# Patient Record
Sex: MALE | Race: WHITE | NOT HISPANIC OR LATINO | Employment: FULL TIME | URBAN - METROPOLITAN AREA
[De-identification: names, ages, dates, MRNs, and addresses within clinical notes are randomized per-mention and may not be internally consistent; named-entity substitution may affect disease eponyms.]

---

## 2017-09-11 VITALS
RESPIRATION RATE: 20 BRPM | WEIGHT: 210 LBS | DIASTOLIC BLOOD PRESSURE: 91 MMHG | OXYGEN SATURATION: 98 % | HEART RATE: 79 BPM | HEIGHT: 65 IN | TEMPERATURE: 99 F | SYSTOLIC BLOOD PRESSURE: 163 MMHG | BODY MASS INDEX: 34.99 KG/M2

## 2017-09-11 PROCEDURE — 99283 EMERGENCY DEPT VISIT LOW MDM: CPT | Mod: 25

## 2017-09-11 PROCEDURE — 64402 HC NERVE BLOCK INJ - FACIAL: CPT

## 2017-09-11 RX ORDER — ETODOLAC 300 MG/1
300 CAPSULE ORAL 2 TIMES DAILY
COMMUNITY
End: 2018-01-31

## 2017-09-12 ENCOUNTER — HOSPITAL ENCOUNTER (EMERGENCY)
Facility: HOSPITAL | Age: 32
Discharge: HOME | End: 2017-09-12
Attending: EMERGENCY MEDICINE
Payer: OTHER GOVERNMENT

## 2017-09-12 DIAGNOSIS — K04.7 DENTAL ABSCESS: Primary | ICD-10-CM

## 2017-09-12 PROCEDURE — 25000003 PHARM REV CODE 250: Performed by: EMERGENCY MEDICINE

## 2017-09-12 RX ORDER — AMOXICILLIN 500 MG/1
500 CAPSULE ORAL 3 TIMES DAILY
Qty: 21 CAPSULE | Refills: 0 | Status: SHIPPED | OUTPATIENT
Start: 2017-09-12 | End: 2017-09-19

## 2017-09-12 RX ORDER — AMOXICILLIN 250 MG/1
500 CAPSULE ORAL
Status: COMPLETED | OUTPATIENT
Start: 2017-09-12 | End: 2017-09-12

## 2017-09-12 RX ORDER — BUPIVACAINE HYDROCHLORIDE AND EPINEPHRINE 5; 5 MG/ML; UG/ML
10 INJECTION, SOLUTION EPIDURAL; INTRACAUDAL; PERINEURAL ONCE
Status: COMPLETED | OUTPATIENT
Start: 2017-09-12 | End: 2017-09-12

## 2017-09-12 RX ORDER — HYDROCODONE BITARTRATE AND ACETAMINOPHEN 5; 325 MG/1; MG/1
1 TABLET ORAL EVERY 4 HOURS PRN
Qty: 15 TABLET | Refills: 0 | Status: SHIPPED | OUTPATIENT
Start: 2017-09-12 | End: 2017-10-22

## 2017-09-12 RX ADMIN — AMOXICILLIN 500 MG: 250 CAPSULE ORAL at 12:09

## 2017-09-12 RX ADMIN — BUPIVACAINE HYDROCHLORIDE AND EPINEPHRINE BITARTRATE 10 ML: 5; .005 INJECTION, SOLUTION EPIDURAL; INTRACAUDAL; PERINEURAL at 12:09

## 2017-09-12 NOTE — ED PROVIDER NOTES
Encounter Date: 9/11/2017    SCRIBE #1 NOTE: I, Zachary Cifuentes, am scribing for, and in the presence of,  Ha Ruby MD. I have scribed the entire note.       History     Chief Complaint   Patient presents with    Dental Pain     presents with dental pain to bottom L back tooth. tooth broken and decayed x5 months. reports increased pain x2-3 days. no obvious facial swelling. denies fever, chill.s appt with dentist on Wednesday.     Time patient was seen by the provider: 12:30 AM      The patient is a 31 y.o. male with hx of: brain surgery that presents to the ED with a complaint of left lower dental pain for the past 3 days. He notes it has had increased swelling in the past 24 hours or so. He has not had any fever, N/V, shortness of breath or difficulty swallowing.     He is on Lodine.            Review of patient's allergies indicates:  No Known Allergies  History reviewed. No pertinent past medical history.  Past Surgical History:   Procedure Laterality Date    BRAIN SURGERY      TRACHEAL SURGERY  had scar tissue removed around trachea     History reviewed. No pertinent family history.  Social History   Substance Use Topics    Smoking status: Current Every Day Smoker     Packs/day: 0.50     Types: Cigarettes    Smokeless tobacco: Current User    Alcohol use Yes      Comment: 8-9 cigarettes a day     Review of Systems   Constitutional: Negative for fever.   HENT: Positive for dental problem. Negative for sore throat.    Respiratory: Negative for shortness of breath.    Cardiovascular: Negative for chest pain.   Gastrointestinal: Negative for nausea.   Genitourinary: Negative for dysuria.   Musculoskeletal: Negative for back pain.   Skin: Negative for rash.   Neurological: Negative for weakness.   Hematological: Does not bruise/bleed easily.       Physical Exam     Initial Vitals [09/11/17 2259]   BP Pulse Resp Temp SpO2   (!) 163/91 79 20 98.5 °F (36.9 °C) 98 %      MAP       115         Physical  Exam    Nursing note and vitals reviewed.  Constitutional: He appears well-developed and well-nourished.   HENT:   Head: Normocephalic and atraumatic.   Filling present, tooth # 7  cracked and missing tooth # 15 cracked, carries at site of fillings, tooth 19 is tender with minimal gingival erythema,    Eyes: Conjunctivae are normal.   Neck: Neck supple.   Cardiovascular: Normal rate, regular rhythm, normal heart sounds and intact distal pulses. Exam reveals no gallop and no friction rub.    No murmur heard.  Pulmonary/Chest: Breath sounds normal. He has no wheezes. He has no rhonchi. He has no rales.   Abdominal: Soft. He exhibits no distension. There is no tenderness.   Musculoskeletal: Normal range of motion.   Neurological: He is alert and oriented to person, place, and time.   Skin: No rash noted. No erythema.   Psychiatric: He has a normal mood and affect.         ED Course   Nerve Block  Date/Time: 9/12/2017 12:43 AM  Performed by: ALBER WISEMAN.  Authorized by: ALBER WISMEAN   Consent Done: Not Needed  Indications: pain relief  Body area: face/mouth  Nerve: inferior alveolar  Laterality: left  Patient sedated: no  Patient position: sitting  Needle size: 25 G  Location technique: anatomical landmarks  Local Anesthetic: bupivacaine 0.5% with epinephrine  Complications: No  Outcome: pain improved  Patient tolerance: Patient tolerated the procedure well with no immediate complications        Labs Reviewed - No data to display          Medical Decision Making:   ED Management:  The patient appears to have pulpitis.  Based upon the history and physical I see no signs of Mode's angina, sublingual swelling, facial swelling, airway compromise, facial cellulitis, sepsis, dehydration, or a fluctuant abscess to drain.  I believe the patient can be discharged home with antibiotics, anti-inflammatories, and pain medications.  The patient has been given specific return precautions and instructed to follow up with a  dentist.     I will provide dental block and prescription for Amoxil.            Scribe Attestation:   Scribe #1: I performed the above scribed service and the documentation accurately describes the services I performed. I attest to the accuracy of the note.    Attending Attestation:           Physician Attestation for Scribe:  Physician Attestation Statement for Scribe #1: I, Ha Ruby MD, reviewed documentation, as scribed by Ha Cifuentes in my presence, and it is both accurate and complete.                 ED Course      Clinical Impression:     1. Dental abscess                                 Ha Ruby MD  09/12/17 0057

## 2017-10-22 ENCOUNTER — HOSPITAL ENCOUNTER (EMERGENCY)
Facility: HOSPITAL | Age: 32
Discharge: HOME | End: 2017-10-22
Attending: EMERGENCY MEDICINE
Payer: OTHER GOVERNMENT

## 2017-10-22 VITALS
TEMPERATURE: 99 F | OXYGEN SATURATION: 97 % | WEIGHT: 210 LBS | HEIGHT: 66 IN | HEART RATE: 77 BPM | RESPIRATION RATE: 16 BRPM | SYSTOLIC BLOOD PRESSURE: 174 MMHG | BODY MASS INDEX: 33.75 KG/M2 | DIASTOLIC BLOOD PRESSURE: 87 MMHG

## 2017-10-22 DIAGNOSIS — K02.9 DENTAL CARIES: ICD-10-CM

## 2017-10-22 DIAGNOSIS — K04.01 ACUTE PULPITIS: Primary | ICD-10-CM

## 2017-10-22 PROCEDURE — 99283 EMERGENCY DEPT VISIT LOW MDM: CPT

## 2017-10-22 RX ORDER — PENICILLIN V POTASSIUM 500 MG/1
500 TABLET, FILM COATED ORAL EVERY 6 HOURS
Qty: 28 TABLET | Refills: 0 | Status: SHIPPED | OUTPATIENT
Start: 2017-10-22 | End: 2017-10-29

## 2017-10-22 NOTE — ED PROVIDER NOTES
Encounter Date: 10/22/2017       History     Chief Complaint   Patient presents with    Dental Pain     pt c/o lower right dental pain that started yesterday      32-year-old male here for right lower dental pain for 3 days.  He reports he has had a cracked tooth for over one month, but the pain started 3 days ago.  He denies fevers/chills, trauma, sore throat, dyspnea, drooling, dysphasia, and facial swelling.  He is on Lodine for chronic back pain, and he reports the medication is also helping his dental pain.  He is established with [facility] dental clinic, and his next appointment is on November 2.  He denies history of immunosuppression and diabetes.that is the extent of the patient's complaints at this time.      The history is provided by the patient.   Dental Pain   The primary symptoms include mouth pain. Primary symptoms do not include dental injury, oral bleeding, oral lesions, headaches, fever, sore throat, angioedema or cough. The symptoms began several days ago. The symptoms are unchanged. The symptoms are new. The symptoms occur constantly.   Mouth pain began 3 - 5 days ago. Mouth pain occurs constantly. Mouth pain is unchanged. Affected locations include: teeth. At its highest the mouth pain was at 7/10. The mouth pain is currently at 7/10.   Additional symptoms include: dental sensitivity to temperature. Additional symptoms do not include: gum swelling, gum tenderness, purulent gums, trismus, jaw pain, facial swelling, trouble swallowing, pain with swallowing, excessive salivation, dry mouth, taste disturbance, smell disturbance, drooling, ear pain, nosebleeds and fatigue. Medical issues include: smoking. Medical issues do not include: immunosuppression.     Review of patient's allergies indicates:  No Known Allergies  History reviewed. No pertinent past medical history.  Past Surgical History:   Procedure Laterality Date    BRAIN SURGERY      TRACHEAL SURGERY  had scar tissue removed around trachea      History reviewed. No pertinent family history.  Social History   Substance Use Topics    Smoking status: Current Every Day Smoker     Packs/day: 0.50     Types: Cigarettes    Smokeless tobacco: Former User    Alcohol use Yes      Comment: 8-9 cigarettes a day     Review of Systems   Constitutional: Negative for chills, fatigue and fever.   HENT: Positive for dental problem. Negative for drooling, ear pain, facial swelling, nosebleeds, rhinorrhea, sore throat and trouble swallowing.    Respiratory: Negative for cough.    Cardiovascular: Negative for chest pain.   Gastrointestinal: Negative for vomiting.   Musculoskeletal: Negative for neck pain and neck stiffness.   Skin: Negative.  Negative for wound.   Allergic/Immunologic: Negative for immunocompromised state.   Neurological: Negative for headaches.   Psychiatric/Behavioral: Negative for confusion.   All other systems reviewed and are negative.      Physical Exam     Initial Vitals [10/22/17 1758]   BP Pulse Resp Temp SpO2   (!) 174/87 77 16 98.5 °F (36.9 °C) 97 %      MAP       116         Physical Exam    Nursing note and vitals reviewed.  Constitutional: Vital signs are normal. He appears well-developed and well-nourished. He is Obese . He is active and cooperative. He is easily aroused.  Non-toxic appearance. He does not have a sickly appearance. He does not appear ill. No distress.   HENT:   Head: Normocephalic and atraumatic.   Right Ear: External ear normal.   Left Ear: External ear normal.   Nose: Nose normal.   Mouth/Throat: Uvula is midline, oropharynx is clear and moist and mucous membranes are normal. He does not have dentures. No oral lesions. No trismus in the jaw. Normal dentition. Dental caries present. No dental abscesses or uvula swelling.       Dental fracture.  No visualized abscess.  No Mode's or trismus.    Eyes: Conjunctivae are normal.   Neck: Normal range of motion and full passive range of motion without pain.   Cardiovascular:  Normal rate and regular rhythm.   Pulmonary/Chest: Effort normal and breath sounds normal.   Abdominal: Soft. Normal appearance and bowel sounds are normal. There is no tenderness.   Lymphadenopathy:     He has no cervical adenopathy.   Neurological: He is alert, oriented to person, place, and time and easily aroused. GCS eye subscore is 4. GCS verbal subscore is 5. GCS motor subscore is 6.   Skin: Skin is warm, dry and intact. No rash noted.   Psychiatric: He has a normal mood and affect. His speech is normal and behavior is normal. Judgment and thought content normal. Cognition and memory are normal.         ED Course   Procedures  Labs Reviewed - No data to display          Medical Decision Making:   Initial Assessment:   33yo male here for right lower dental pain for three days.  No trauma, fever/chills, dyspnea, drooling, facial swelling, dysphagia, sore throat, or vomiting.  The patient reports that his Lodine does help the pain.   Pt appears well, nontoxic. Percussion tenderness and dental decay at #28.  No visualized abscess.  No Mode's or trismus.  Throat normal.    Differential Diagnosis:   Acute pulpitis, dental caries, dental abscess  ED Management:  Exam  There is no indication for imaging or labs at this time.  I do not suspect a deep space infection.  Pt has no Mode's angina.  There is no indication for emergent extraction.  I feel the patient's pain is due to acute pulpitis due to dental decay.  He is to follow-up with his dentist as scheduled.  Return to the ER if condition changes, progresses, or if there are any concerns.  I did advised the patient to stop smoking.the patient verbalizes understanding, compliance, and agreement with the treatment plan.  RX Penicillin V Potassium                    ED Course      Clinical Impression:   The primary encounter diagnosis was Acute pulpitis. A diagnosis of Dental caries was also pertinent to this visit.                           Tasha PADILLA  Fawad, JULIA  10/22/17 1832

## 2017-10-22 NOTE — ED NOTES
Pain to right lower tooth that began last pm.  Sees [facility] but cannot be seen until 11/2/2017.  Here for pain medication.  Denies fever, drainage from gums

## 2018-01-31 ENCOUNTER — OFFICE VISIT (OUTPATIENT)
Dept: FAMILY MEDICINE | Facility: HOSPITAL | Age: 33
End: 2018-01-31
Attending: FAMILY MEDICINE
Payer: OTHER GOVERNMENT

## 2018-01-31 VITALS
DIASTOLIC BLOOD PRESSURE: 85 MMHG | HEART RATE: 72 BPM | BODY MASS INDEX: 35.86 KG/M2 | HEIGHT: 66 IN | SYSTOLIC BLOOD PRESSURE: 129 MMHG | WEIGHT: 223.13 LBS

## 2018-01-31 DIAGNOSIS — M54.50 CHRONIC BILATERAL LOW BACK PAIN WITHOUT SCIATICA: Primary | ICD-10-CM

## 2018-01-31 DIAGNOSIS — R29.818 NEUROLOGICAL ABNORMALITY: ICD-10-CM

## 2018-01-31 DIAGNOSIS — G89.29 CHRONIC BILATERAL LOW BACK PAIN WITHOUT SCIATICA: Primary | ICD-10-CM

## 2018-01-31 DIAGNOSIS — R29.2 HYPERREFLEXIA: ICD-10-CM

## 2018-01-31 DIAGNOSIS — R47.9 SPEECH DISTURBANCE, UNSPECIFIED TYPE: ICD-10-CM

## 2018-01-31 DIAGNOSIS — R29.2 HYPERREFLEXIA: Primary | ICD-10-CM

## 2018-01-31 DIAGNOSIS — F48.9: ICD-10-CM

## 2018-01-31 PROCEDURE — 99213 OFFICE O/P EST LOW 20 MIN: CPT | Performed by: FAMILY MEDICINE

## 2018-01-31 RX ORDER — MELOXICAM 15 MG/1
15 TABLET ORAL DAILY
Qty: 30 TABLET | Refills: 0 | Status: SHIPPED | OUTPATIENT
Start: 2018-01-31 | End: 2018-02-01 | Stop reason: ALTCHOICE
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
## 2018-02-01 ENCOUNTER — OFFICE VISIT (OUTPATIENT)
Dept: NEUROLOGY | Facility: CLINIC | Age: 33
End: 2018-02-01
Payer: OTHER GOVERNMENT

## 2018-02-01 VITALS
HEART RATE: 73 BPM | BODY MASS INDEX: 36.03 KG/M2 | DIASTOLIC BLOOD PRESSURE: 89 MMHG | HEIGHT: 66 IN | SYSTOLIC BLOOD PRESSURE: 137 MMHG | WEIGHT: 224.19 LBS

## 2018-02-01 DIAGNOSIS — G40.409 EPILEPSY SYMPTOMATIC, GENERALIZED: ICD-10-CM

## 2018-02-01 PROCEDURE — 99999 PR PBB SHADOW E&M-EST. PATIENT-LVL III: CPT | Mod: PBBFAC,,, | Performed by: PSYCHIATRY & NEUROLOGY

## 2018-02-01 PROCEDURE — 99205 OFFICE O/P NEW HI 60 MIN: CPT | Mod: S$PBB,,, | Performed by: PSYCHIATRY & NEUROLOGY

## 2018-02-01 PROCEDURE — 3008F BODY MASS INDEX DOCD: CPT | Mod: ,,, | Performed by: PSYCHIATRY & NEUROLOGY

## 2018-02-01 PROCEDURE — 99213 OFFICE O/P EST LOW 20 MIN: CPT | Mod: PBBFAC | Performed by: PSYCHIATRY & NEUROLOGY
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
## 2018-02-01 NOTE — LETTER
February 1, 2018      Fran Acuña MD  [address]  [address]  [facility] [address]           [facility]  [address]  [place] [address]  Phone: [phone]  Fax: [phone]          Patient: Sudeep Kyle   MR Number: [id number]   YOB: 1985   Date of Visit: 2/1/2018       Dear Dr. Fran Acuña:    Thank you for referring Sudeep Kyle to me for evaluation. Attached you will find relevant portions of my assessment and plan of care.    If you have questions, please do not hesitate to call me. I look forward to following Sudeep Kyle along with you.    Sincerely,    Parmjit Freeman MD    Enclosure  CC:  No Recipients    If you would like to receive this communication electronically, please contact [email or web address] or [phone] to request more information on [employer] access.    For providers and/or their staff who would like to refer a patient to [facility], please contact us through our one-stop-shop provider referral line, [facility], at [phone].    If you feel you have received this communication in error or would no longer like to receive these types of communications, please e-mail [email or web address]

## 2018-02-14 ENCOUNTER — TELEPHONE (OUTPATIENT)
Dept: NEUROLOGY | Facility: CLINIC | Age: 33
End: 2018-02-14

## 2018-02-21 ENCOUNTER — HOSPITAL ENCOUNTER (OUTPATIENT)
Dept: RADIOLOGY | Facility: HOSPITAL | Age: 33
Discharge: HOME | End: 2018-02-21
Attending: PSYCHIATRY & NEUROLOGY
Payer: OTHER GOVERNMENT

## 2018-02-21 DIAGNOSIS — G40.409 EPILEPSY SYMPTOMATIC, GENERALIZED: ICD-10-CM

## 2018-02-21 PROCEDURE — 70551 MRI BRAIN STEM W/O DYE: CPT | Mod: 26,,, | Performed by: RADIOLOGY

## 2018-02-21 PROCEDURE — 70551 MRI BRAIN STEM W/O DYE: CPT | Mod: TC
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
## 2018-03-02 ENCOUNTER — TELEPHONE (OUTPATIENT)
Dept: NEUROLOGY | Facility: CLINIC | Age: 33
End: 2018-03-02

## 2018-03-05 ENCOUNTER — TELEPHONE (OUTPATIENT)
Dept: NEUROLOGY | Facility: CLINIC | Age: 33
End: 2018-03-05

## 2018-03-06 NOTE — TELEPHONE ENCOUNTER
I called him back to schedule his emu admit. Letter was sent with instructions. He will gypsy back for further concerns or questions

## 2018-03-13 ENCOUNTER — OFFICE VISIT (OUTPATIENT)
Dept: FAMILY MEDICINE | Facility: HOSPITAL | Age: 33
End: 2018-03-13
Attending: FAMILY MEDICINE
Payer: OTHER GOVERNMENT

## 2018-03-13 VITALS — HEART RATE: 69 BPM | DIASTOLIC BLOOD PRESSURE: 96 MMHG | SYSTOLIC BLOOD PRESSURE: 142 MMHG

## 2018-03-13 DIAGNOSIS — S83.92XA SPRAIN OF LEFT KNEE, UNSPECIFIED LIGAMENT, INITIAL ENCOUNTER: Primary | ICD-10-CM

## 2018-03-13 PROCEDURE — 99212 OFFICE O/P EST SF 10 MIN: CPT | Performed by: FAMILY MEDICINE

## 2018-03-13 RX ORDER — NAPROXEN 500 MG/1
500 TABLET ORAL 2 TIMES DAILY WITH MEALS
Qty: 60 TABLET | Refills: 0 | Status: SHIPPED | OUTPATIENT
Start: 2018-03-13 | End: 2019-09-28 | Stop reason: SDUPTHER

## 2018-03-13 NOTE — PROGRESS NOTES
Subjective:       Patient ID: Sudeep Kyle is a 32 y.o. male.    Chief Complaint: Knee Pain    HPI   31 y/o M present with acute onset of medial Left knee pain. Pain started yesterday will getting out of his bed. He immediately felt a sharp, non-radiating pain with an intensity of 7/10. He denies any history of L knee pain or trauma. No swelling, clicking, locking, or giving way of the knee. Pain is aggravated with movement and alleviated with rest. He has tried no medication.    Review of Systems   Constitutional: Negative for chills, diaphoresis, fatigue and fever.   HENT: Negative for nosebleeds, rhinorrhea and sneezing.    Eyes: Negative for pain.   Respiratory: Negative for apnea, cough, chest tightness, shortness of breath and wheezing.    Cardiovascular: Negative for chest pain, palpitations and leg swelling.   Gastrointestinal: Negative for abdominal pain, constipation, diarrhea, nausea and vomiting.   Endocrine: Negative.    Genitourinary: Negative for dysuria, flank pain, frequency and urgency.   Musculoskeletal: Positive for arthralgias. Negative for back pain, gait problem, joint swelling and myalgias.   Skin: Negative.    Allergic/Immunologic: Negative.    Neurological: Negative for dizziness, weakness and light-headedness.   Hematological: Negative.    Psychiatric/Behavioral: Negative.        Objective:      Vitals:    03/13/18 1025   BP: (!) 142/96   Pulse: 69     Physical Exam   Constitutional: He is oriented to person, place, and time. He appears well-developed and well-nourished.   HENT:   Head: Normocephalic.   Eyes: Pupils are equal, round, and reactive to light.   Neck: Normal range of motion. Neck supple.   Cardiovascular: Normal rate, regular rhythm, normal heart sounds and intact distal pulses.  Exam reveals no gallop and no friction rub.    No murmur heard.  Pulmonary/Chest: Effort normal and breath sounds normal. He has no wheezes. He has no rales.   Abdominal: Soft. Bowel sounds are normal.  He exhibits no distension. There is no tenderness.   Musculoskeletal: Normal range of motion. He exhibits tenderness.        Left knee: He exhibits normal range of motion, no swelling, no ecchymosis, no laceration, no erythema and no bony tenderness. Tenderness found. Medial joint line tenderness noted. No lateral joint line tenderness noted.   Negative Flakito. Negative Ant Drawer Test and Lockman   Neurological: He is alert and oriented to person, place, and time.   Skin: Skin is warm and dry.   Psychiatric: He has a normal mood and affect. His behavior is normal. Judgment and thought content normal.       Assessment:       1. Sprain of left knee, unspecified ligament, initial encounter        Plan:       Sprain of left knee, unspecified ligament, initial encounter  -     naproxen (NAPROSYN) 500 MG tablet; Take 1 tablet (500 mg total) by mouth 2 (two) times daily with meals.  Dispense: 60 tablet; Refill: 0      RTC in 6-8 weeks if pain persists. BP elevated 2/2 to pain. Advised pt to monitor BP.

## 2018-04-06 DIAGNOSIS — R56.9 SEIZURES: Primary | ICD-10-CM

## 2018-04-17 ENCOUNTER — TELEPHONE (OUTPATIENT)
Dept: NEUROLOGY | Facility: CLINIC | Age: 33
End: 2018-04-17

## 2018-04-17 NOTE — TELEPHONE ENCOUNTER
Pt returned my call to say he could not get off for emu. He will call back to reschedule at another time

## 2019-09-28 ENCOUNTER — HOSPITAL ENCOUNTER (EMERGENCY)
Facility: HOSPITAL | Age: 34
Discharge: HOME | End: 2019-09-28
Attending: EMERGENCY MEDICINE
Payer: OTHER GOVERNMENT

## 2019-09-28 VITALS
RESPIRATION RATE: 16 BRPM | HEART RATE: 71 BPM | WEIGHT: 220 LBS | TEMPERATURE: 98 F | BODY MASS INDEX: 35.36 KG/M2 | SYSTOLIC BLOOD PRESSURE: 153 MMHG | HEIGHT: 66 IN | OXYGEN SATURATION: 97 % | DIASTOLIC BLOOD PRESSURE: 88 MMHG

## 2019-09-28 DIAGNOSIS — S83.92XA SPRAIN OF LEFT KNEE, UNSPECIFIED LIGAMENT, INITIAL ENCOUNTER: ICD-10-CM

## 2019-09-28 DIAGNOSIS — K02.9 DENTAL CARIES: ICD-10-CM

## 2019-09-28 DIAGNOSIS — K04.01 ACUTE PULPITIS: Primary | ICD-10-CM

## 2019-09-28 DIAGNOSIS — R03.0 ELEVATED BLOOD PRESSURE READING: ICD-10-CM

## 2019-09-28 PROCEDURE — 99284 EMERGENCY DEPT VISIT MOD MDM: CPT

## 2019-09-28 RX ORDER — PENICILLIN V POTASSIUM 500 MG/1
500 TABLET, FILM COATED ORAL EVERY 6 HOURS
Qty: 40 TABLET | Refills: 0 | Status: SHIPPED | OUTPATIENT
Start: 2019-09-28 | End: 2019-10-05

## 2019-09-28 RX ORDER — NAPROXEN 500 MG/1
500 TABLET ORAL 2 TIMES DAILY WITH MEALS
Qty: 14 TABLET | Refills: 0 | Status: SHIPPED | OUTPATIENT
Start: 2019-09-28

## 2019-09-28 NOTE — ED PROVIDER NOTES
Encounter Date: 9/28/2019       History     Chief Complaint   Patient presents with    Facial Pain     Pt started feeling L sided facial pain since today, pt is congested, complains also or watery eyes. Denies cough fever, or chills. Pt beleives it can be related to his teeth.      34-year-old male presents the ED for right-sided facial pain.  The patient believes that he has a bad tooth.  The pain started today and is worse with chewing.  No dyspnea, drooling, dysphagia, trauma, or fever.  He has occasional nasal congestion that started today.  He does have a dentist.  The patient is tolerating oral fluids without difficulty.  No other complaints at this time.    The history is provided by the patient.     Review of patient's allergies indicates:  No Known Allergies  History reviewed. No pertinent past medical history.  Past Surgical History:   Procedure Laterality Date    BRAIN SURGERY      TRACHEAL SURGERY  had scar tissue removed around trachea     History reviewed. No pertinent family history.  Social History     Tobacco Use    Smoking status: Current Every Day Smoker     Packs/day: 0.50     Types: Cigarettes    Smokeless tobacco: Former User   Substance Use Topics    Alcohol use: Yes     Comment: 8-9 cigarettes a day    Drug use: No     Review of Systems   Constitutional: Negative for activity change, fatigue and fever.   HENT: Positive for congestion and dental problem. Negative for ear pain, facial swelling, rhinorrhea, sinus pain, sore throat, trouble swallowing and voice change.    Eyes: Negative for pain.   Respiratory: Negative for cough.    Gastrointestinal: Negative for vomiting.   Musculoskeletal: Negative for neck pain.   Skin: Negative.  Negative for rash and wound.   Allergic/Immunologic: Negative for immunocompromised state.   Neurological: Negative for headaches.   Psychiatric/Behavioral: Negative for confusion.   All other systems reviewed and are negative.      Physical Exam     Initial  Vitals [09/28/19 1722]   BP Pulse Resp Temp SpO2   (!) 174/103 73 16 98.1 °F (36.7 °C) 97 %      MAP       --         Physical Exam    Nursing note and vitals reviewed.  Constitutional: He appears well-developed and well-nourished. He is Obese . He is cooperative. He is easily aroused.  Non-toxic appearance. He does not have a sickly appearance. He does not appear ill. No distress.   HENT:   Head: Normocephalic and atraumatic.   Right Ear: External ear normal.   Left Ear: External ear normal.   Nose: Nose normal.   Mouth/Throat: Uvula is midline and oropharynx is clear and moist. No trismus in the jaw. Dental caries present. No dental abscesses or uvula swelling.       No Mode's. No trismus. Managing secretions without difficulty.    Eyes: Conjunctivae are normal.   Neck: Trachea normal, normal range of motion, full passive range of motion without pain and phonation normal. Neck supple. No stridor present.   Cardiovascular: Normal rate, regular rhythm and normal heart sounds.   Pulmonary/Chest: Effort normal and breath sounds normal.   Abdominal: Soft. Normal appearance.   Neurological: He is alert, oriented to person, place, and time and easily aroused. GCS eye subscore is 4. GCS verbal subscore is 5. GCS motor subscore is 6.   Skin: Skin is warm, dry and intact. No bruising and no rash noted. No erythema.   Psychiatric: He has a normal mood and affect. His speech is normal and behavior is normal. Judgment and thought content normal. Cognition and memory are normal.         ED Course   Procedures  Labs Reviewed - No data to display       Imaging Results    None          Medical Decision Making:   Initial Assessment:   34-year-old male here for right upper dental pain a started today.  He has dental caries.  No Mode's or trismus.  Managing secretions without difficulty.  Differential Diagnosis:   Acute pulpitis, dental caries, dental abscess, dental fracture, dentalgia  ED Management:  Exam  No indication for  labs or imaging.  The patient has acute pulpitis.  He will be treated for dental abscess although an abscess was not visualized.  I do not suspect deep space infection.  He does have a dentist and encouraged follow-up within 1 week.  Reviewed return precautions.  The patient verbalized understanding, compliance, and agreement with the treatment plan.  Rx pen VK and Naprosyn.  The patient's blood pressure was noted to be elevated while in the ED today.  The patient has no associated signs or symptoms of hypertension.  Patient's blood pressure is likely elevated due to situation.  Advised blood pressure recheck by PCP within 1 week.                          Clinical Impression:       ICD-10-CM ICD-9-CM   1. Acute pulpitis K04.01 522.0   2. Dental caries K02.9 521.00   3. Elevated blood pressure reading R03.0 796.2                            Tasha Celestin, JULIA  09/28/19 173

## 2019-09-28 NOTE — DISCHARGE INSTRUCTIONS
Your blood pressure was high in the ED today.  You need to have it rechecked by your doctor within one week.  Return to the ED if your condition changes, progresses, or if you have any concerns.

## 2021-10-13 ENCOUNTER — IMMUNIZATION (OUTPATIENT)
Dept: PRIMARY CARE CLINIC | Facility: CLINIC | Age: 36
End: 2021-10-13
Payer: OTHER GOVERNMENT

## 2021-10-13 DIAGNOSIS — Z23 NEED FOR VACCINATION: Primary | ICD-10-CM

## 2021-10-13 PROCEDURE — 0031A COVID-19,VECTOR-NR,RS-AD26,PF,0.5 ML DOSE VACCINE (JANSSEN): CPT | Mod: CV19,PBBFAC | Performed by: INTERNAL MEDICINE
